# Patient Record
Sex: MALE | Race: BLACK OR AFRICAN AMERICAN | NOT HISPANIC OR LATINO | URBAN - METROPOLITAN AREA
[De-identification: names, ages, dates, MRNs, and addresses within clinical notes are randomized per-mention and may not be internally consistent; named-entity substitution may affect disease eponyms.]

---

## 2021-10-24 ENCOUNTER — EMERGENCY (EMERGENCY)
Facility: HOSPITAL | Age: 21
LOS: 0 days | Discharge: HOME | End: 2021-10-24
Attending: EMERGENCY MEDICINE
Payer: COMMERCIAL

## 2021-10-24 VITALS
HEART RATE: 65 BPM | WEIGHT: 199.96 LBS | TEMPERATURE: 97 F | SYSTOLIC BLOOD PRESSURE: 136 MMHG | HEIGHT: 72 IN | RESPIRATION RATE: 20 BRPM | OXYGEN SATURATION: 100 % | DIASTOLIC BLOOD PRESSURE: 90 MMHG

## 2021-10-24 VITALS
HEART RATE: 82 BPM | SYSTOLIC BLOOD PRESSURE: 111 MMHG | RESPIRATION RATE: 18 BRPM | OXYGEN SATURATION: 100 % | TEMPERATURE: 98 F | DIASTOLIC BLOOD PRESSURE: 68 MMHG
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
## 2021-10-24 DIAGNOSIS — Z91.010 ALLERGY TO PEANUTS: ICD-10-CM

## 2021-10-24 DIAGNOSIS — F20.9 SCHIZOPHRENIA, UNSPECIFIED: ICD-10-CM

## 2021-10-24 DIAGNOSIS — R45.1 RESTLESSNESS AND AGITATION: ICD-10-CM

## 2021-10-24 DIAGNOSIS — S61.411A LACERATION WITHOUT FOREIGN BODY OF RIGHT HAND, INITIAL ENCOUNTER: ICD-10-CM

## 2021-10-24 DIAGNOSIS — M79.89 OTHER SPECIFIED SOFT TISSUE DISORDERS: ICD-10-CM
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
## 2021-10-24 PROCEDURE — 12001 RPR S/N/AX/GEN/TRNK 2.5CM/<: CPT

## 2021-10-24 PROCEDURE — 73130 X-RAY EXAM OF HAND: CPT | Mod: 26,RT

## 2021-10-24 PROCEDURE — 99284 EMERGENCY DEPT VISIT MOD MDM: CPT | Mod: 25

## 2021-10-24 NOTE — ED PROVIDER NOTE - PATIENT PORTAL LINK FT
You can access the FollowMyHealth Patient Portal offered by [facility] by registering at the following website: [email or web address]. By joining [employer]’s FollowMyHealth portal, you will also be able to view your health information using other applications (apps) compatible with our system.

## 2021-10-24 NOTE — ED ADULT NURSE NOTE - OBJECTIVE STATEMENT
Pt is A&OX3, c/o feeling general weakness and pain. Pt has a laceration on the right hand, wrapped by EMS. the patient has hx of schizophrenia, past episodes of aggression, lives in a shelter in [place] as per  at bedside. Pt is stable and denies other pain and symptoms. Pt is A&OX3, c/o feeling general weakness and pain. Pt has a laceration on the right hand, wrapped by EMS. No active bleeding noted. the patient has hx of schizophrenia, past episodes of aggression, lives in a shelter in [place] as per  at bedside. Pt is stable and denies other pain and symptoms. Safety and security measures maintained. Pt is A&OX3, c/o feeling general weakness and pain. Pt has a laceration on the right hand, wrapped by EMS. No active bleeding noted. the patient has hx of schizophrenia, past episodes of aggression, lives in a shelter in [place] as per  at bedside. Pt is stable and denies other pain and symptoms. Safety and security measures maintained. Pt arrrived to ED with [employer] cuffs to b/l hands

## 2021-10-24 NOTE — ED PROVIDER NOTE - PSYCHIATRIC, MLM
Alert and oriented to person, place, time/situation. normal mood and affect. no apparent risk to self or others. not agitated.

## 2021-10-24 NOTE — ED PROVIDER NOTE - MUSCULOSKELETAL MINIMAL EXAM
No evidence of rash. right hand laceration at base of thumb dorsal aspect, web space area .5 cm each mild swelling no focal tender and ROM intact.
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
## 2021-10-24 NOTE — ED PROVIDER NOTE - NSFOLLOWUPINSTRUCTIONS_ED_ALL_ED_FT
Skin Adhesive Care    WHAT YOU NEED TO KNOW:    Skin adhesive is medical glue used to close wounds. It is a substitute for staples and stitches. Skin adhesive wound closures take less time and do not require anesthesia. You have less pain and a lower risk of infection than with staples or stitches. Skin adhesive will fall off after the wound is healed.     DISCHARGE INSTRUCTIONS:    Self-care:   •Keep your wound clean and dry for 1 to 5 days. You can shower 24 hours after the skin adhesive is applied. Lightly pat your wound dry after you shower.      •Do not soak your wound in water, such as in a bath or hot tub.      •Do not scrub your wound or pick at the adhesive. This can make your wound reopen.       •Do not apply ointments to your wound. These include antibiotic and other ointments that contain petroleum jelly. These products will remove skin adhesive and reopen your wound.       Follow up with your doctor as directed: Write down your questions so you remember to ask them during your visits.     Contact your healthcare provider if:   •You have a fever.       •Your wound is red and warm to touch.       •You have questions or concerns about your condition or care.       Return to the emergency department if:   •Your wound has fluid draining from it.       •Your wound opens.
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
## 2021-10-24 NOTE — ED PROVIDER NOTE - CLINICAL SUMMARY MEDICAL DECISION MAKING FREE TEXT BOX
laceration repaired of hand - xray without acute findings - will dc. and pt otherwise fit for confinement as pt not acutely agitated in ED.

## 2023-03-07 NOTE — ED ADULT NURSE NOTE - NSIMPLEMENTINTERV_GEN_ALL_ED
[5] : was Francisco stage 5 [Francisco Stage ___] : the Francisco stage for breast development was [unfilled] [Normal] : PERRL, extraocular movements intact, cranial nerves II-XII grossly intact [100: Normal, no complaints, no evidence of disease.] : 100: Normal, no complaints, no evidence of disease. [de-identified] : Some hyperpigmentation on arms Implemented All Universal Safety Interventions:  [place] to call system. Call bell, personal items and telephone within reach. Instruct patient to call for assistance. Room bathroom lighting operational. Non-slip footwear when patient is off stretcher. Physically safe environment: no spills, clutter or unnecessary equipment. Stretcher in lowest position, wheels locked, appropriate side rails in place.

## 2024-04-01 NOTE — ED ADULT NURSE NOTE - CAS DISCH BELONGINGS RETURNED
Patient is confused, encephalopathic. Mentation is waxing and waning. As per constant observer, Jamila had visual hallucinations in the morning.   Stool softeners were administered because patient was constipated - as per CT scan results.  I crushed the pills and administered them in apple sauce.   Patient is still trying to get out of bed 2/2 confusion, therefore constant observer is still needed.   Up with 2/Ana Steady.  She uses [facility].  Baseline weakness 2/2 previous stroke.    Yes